# Patient Record
(demographics unavailable — no encounter records)

---

## 2025-05-23 NOTE — ASSESSMENT
[FreeTextEntry1] : 12-year-old female with left fifth digit proximal phalanx fracture.  I have placed her in a AlumaFoam splint which she will wear full-time and she will follow-up in a week for reassessment and to monitor the fracture.  She will use over-the-counter medication as needed is aware if there is any issue mom can contact the office and she verbalized understanding and agreement.

## 2025-05-23 NOTE — IMAGING
[de-identified] : Left fifth digit: Positive tenderness over the PIP joint, positive tenderness of the proximal phalanx, edema noted over the PIP and base of the proximal phalanx, no tenderness over the DIP or MP joint, no tenderness over the metacarpals, neurovascularly intact, ecchymosis noted.  X-ray left fifth digit 3 views: Nondisplaced fracture of the proximal phalanx.

## 2025-05-23 NOTE — HISTORY OF PRESENT ILLNESS
[de-identified] : Lori is a 12 year old LHD female who presents to the walk in accompanied by her mother for evaluation of L V digit pain s/p injury that occurred 6 days ago.  She states she was playing volleyball when she hit the ball and her pinky bent backwards.  She states it was very painful and swollen especially over the PIP and proximal phalanx.  She went to urgent care 4 days ago where she was told she had a fracture of the proximal phalanx although the x-ray is not available for my review and she was placed in a splint.  She has been taking Tylenol with improvement.

## 2025-05-23 NOTE — HISTORY OF PRESENT ILLNESS
[de-identified] : Lori is a 12 year old LHD female who presents to the walk in accompanied by her mother for evaluation of L V digit pain s/p injury that occurred 6 days ago.  She states she was playing volleyball when she hit the ball and her pinky bent backwards.  She states it was very painful and swollen especially over the PIP and proximal phalanx.  She went to urgent care 4 days ago where she was told she had a fracture of the proximal phalanx although the x-ray is not available for my review and she was placed in a splint.  She has been taking Tylenol with improvement.

## 2025-05-23 NOTE — IMAGING
[de-identified] : Left fifth digit: Positive tenderness over the PIP joint, positive tenderness of the proximal phalanx, edema noted over the PIP and base of the proximal phalanx, no tenderness over the DIP or MP joint, no tenderness over the metacarpals, neurovascularly intact, ecchymosis noted.  X-ray left fifth digit 3 views: Nondisplaced fracture of the proximal phalanx.

## 2025-06-09 NOTE — DATA REVIEWED
[Acceptable Fracture Allignment] : fracture alignment remains acceptable [de-identified] :  2 views of left fingers reviewed.

## 2025-06-09 NOTE — ASSESSMENT
[FreeTextEntry1] : Good healing. Can discontinue AlumaFoam splint. No gym/sports for one week. Follow up in 3-4 months for physeal check.

## 2025-06-09 NOTE — PHYSICAL EXAM
[Normal] : The patient is moving all extremities spontaneously without any gross neurologic deficits. They walk with a fluid nonantalgic gait. There are equal and symmetric deep tendon reflexes in the upper and lower extremities bilaterally. There is gross intact sensation to soft and light touch in the bilateral upper and lower extremities [Not Examined] : not examined

## 2025-06-09 NOTE — DATA REVIEWED
[Acceptable Fracture Allignment] : fracture alignment remains acceptable [de-identified] :  2 views of left fingers reviewed.

## 2025-06-09 NOTE — HISTORY OF PRESENT ILLNESS
[FreeTextEntry1] : 13 y/o female here today with left little finger proximal phalanx fracture after playing volleyball and hitting the ball. Patient was last seen by HORACIO Villalobos on 5/23/25 and was placed in an [facility] splint.

## 2025-06-09 NOTE — HISTORY OF PRESENT ILLNESS
[FreeTextEntry1] : 13 y/o female here today with left little finger proximal phalanx fracture after playing volleyball and hitting the ball. Patient was last seen by HORACIO Villalobos on 5/23/25 and was placed in an [facility] splint.